# Patient Record
Sex: MALE | Race: OTHER | Employment: FULL TIME | URBAN - METROPOLITAN AREA
[De-identification: names, ages, dates, MRNs, and addresses within clinical notes are randomized per-mention and may not be internally consistent; named-entity substitution may affect disease eponyms.]

---

## 2022-10-26 ENCOUNTER — OFFICE VISIT (OUTPATIENT)
Dept: GASTROENTEROLOGY | Facility: CLINIC | Age: 37
End: 2022-10-26
Payer: COMMERCIAL

## 2022-10-26 VITALS — HEIGHT: 66 IN | BODY MASS INDEX: 31.18 KG/M2 | WEIGHT: 194 LBS

## 2022-10-26 DIAGNOSIS — K31.1 PYLORIC STENOSIS: Primary | ICD-10-CM

## 2022-10-26 DIAGNOSIS — R11.0 NAUSEA: ICD-10-CM

## 2022-10-26 DIAGNOSIS — K22.70 BARRETT'S ESOPHAGUS WITHOUT DYSPLASIA: ICD-10-CM

## 2022-10-26 DIAGNOSIS — A04.8 HELICOBACTER PYLORI (H. PYLORI) INFECTION: ICD-10-CM

## 2022-10-26 DIAGNOSIS — Z12.11 COLON CANCER SCREENING: ICD-10-CM

## 2022-10-26 DIAGNOSIS — R14.0 BLOATING: ICD-10-CM

## 2022-10-26 DIAGNOSIS — K21.9 GASTROESOPHAGEAL REFLUX DISEASE, UNSPECIFIED WHETHER ESOPHAGITIS PRESENT: ICD-10-CM

## 2022-10-26 PROCEDURE — 99244 OFF/OP CNSLTJ NEW/EST MOD 40: CPT | Performed by: NURSE PRACTITIONER

## 2022-10-26 PROCEDURE — 3008F BODY MASS INDEX DOCD: CPT | Performed by: NURSE PRACTITIONER

## 2022-10-26 RX ORDER — OMEPRAZOLE 40 MG/1
40 CAPSULE, DELAYED RELEASE ORAL 2 TIMES DAILY
Qty: 60 CAPSULE | Refills: 0 | Status: SHIPPED | OUTPATIENT
Start: 2022-10-26

## 2022-10-27 ENCOUNTER — TELEPHONE (OUTPATIENT)
Facility: CLINIC | Age: 37
End: 2022-10-27

## 2022-10-27 DIAGNOSIS — K31.1 PYLORIC STENOSIS: Primary | ICD-10-CM

## 2022-10-27 NOTE — TELEPHONE ENCOUNTER
Scheduled for: EGD w/ possible DIL [id number]  Provider Name:  Dr Perico Ojeda  Date:  11/02/2022  Location:  [facility]  Sedation:  MAC   Time:  12:30pm ( pt is aware arrival time 11:30am)  Prep:  NPO after Midnight   Meds/Allergies Reconciled?:    Diagnosis with codes:  H/o pre-pyloric stenosis K31.1  Was patient informed to call insurance with codes (Y/N):  Yes  Referral sent?:  Referral was sent at the time of electronic surgical scheduling. [facility] [address] or [address] [place] [address] notified?:  I sent an electronic request to Endo Scheduling and received a confirmation today. Medication Orders:  Pt is aware to NOT take iron pills, herbal meds and diet supplements for 7 days before exam. Also to NOT take any form of alcohol, recreational drugs and any forms of ED meds 24 hours before exam.   Misc Orders:  Patient was informed that they will need a COVID 19 test prior to their procedure. Patient verbally understood & will await a phone call from [facility] to schedule. Further instructions given by staff:  I provide prep instructions to patient at the time of the appointment and reviewed date, time and location, he verbalized that he understood and is aware to call if he has any questions.

## 2022-10-30 ENCOUNTER — LAB ENCOUNTER (OUTPATIENT)
Dept: LAB | Facility: HOSPITAL | Age: 37
End: 2022-10-30
Attending: INTERNAL MEDICINE
Payer: COMMERCIAL

## 2022-10-30 DIAGNOSIS — Z01.818 PRE-OP TESTING: ICD-10-CM

## 2022-10-31 LAB — SARS-COV-2 RNA RESP QL NAA+PROBE: NOT DETECTED

## 2022-11-02 ENCOUNTER — HOSPITAL ENCOUNTER (OUTPATIENT)
Age: 37
Setting detail: HOSPITAL OUTPATIENT SURGERY
Discharge: HOME | End: 2022-11-02
Attending: INTERNAL MEDICINE | Admitting: INTERNAL MEDICINE
Payer: COMMERCIAL

## 2022-11-02 ENCOUNTER — ANESTHESIA (OUTPATIENT)
Dept: ENDOSCOPY | Age: 37
End: 2022-11-02
Payer: COMMERCIAL

## 2022-11-02 ENCOUNTER — ANESTHESIA EVENT (OUTPATIENT)
Dept: ENDOSCOPY | Age: 37
End: 2022-11-02
Payer: COMMERCIAL

## 2022-11-02 VITALS
RESPIRATION RATE: 18 BRPM | DIASTOLIC BLOOD PRESSURE: 54 MMHG | WEIGHT: 194 LBS | HEART RATE: 66 BPM | HEIGHT: 67 IN | TEMPERATURE: 98 F | SYSTOLIC BLOOD PRESSURE: 92 MMHG | BODY MASS INDEX: 30.45 KG/M2 | OXYGEN SATURATION: 93 %

## 2022-11-02 DIAGNOSIS — Z01.818 PRE-OP TESTING: Primary | ICD-10-CM

## 2022-11-02 DIAGNOSIS — K31.1 PYLORIC STENOSIS: ICD-10-CM

## 2022-11-02 PROCEDURE — 88312 SPECIAL STAINS GROUP 1: CPT | Performed by: INTERNAL MEDICINE

## 2022-11-02 PROCEDURE — 43249 ESOPH EGD DILATION <30 MM: CPT | Performed by: INTERNAL MEDICINE

## 2022-11-02 PROCEDURE — 99070 SPECIAL SUPPLIES PHYS/QHP: CPT | Performed by: INTERNAL MEDICINE

## 2022-11-02 PROCEDURE — 88305 TISSUE EXAM BY PATHOLOGIST: CPT | Performed by: INTERNAL MEDICINE

## 2022-11-02 RX ORDER — NALOXONE HYDROCHLORIDE 0.4 MG/ML
80 INJECTION, SOLUTION INTRAMUSCULAR; INTRAVENOUS; SUBCUTANEOUS AS NEEDED
Status: DISCONTINUED | OUTPATIENT
Start: 2022-11-02 | End: 2022-11-02

## 2022-11-02 RX ORDER — SODIUM CHLORIDE, SODIUM LACTATE, POTASSIUM CHLORIDE, CALCIUM CHLORIDE 600; 310; 30; 20 MG/100ML; MG/100ML; MG/100ML; MG/100ML
INJECTION, SOLUTION INTRAVENOUS CONTINUOUS
Status: DISCONTINUED | OUTPATIENT
Start: 2022-11-02 | End: 2022-11-02

## 2022-11-02 RX ORDER — LIDOCAINE HYDROCHLORIDE 10 MG/ML
INJECTION, SOLUTION EPIDURAL; INFILTRATION; INTRACAUDAL; PERINEURAL AS NEEDED
Status: DISCONTINUED | OUTPATIENT
Start: 2022-11-02 | End: 2022-11-02 | Stop reason: SURG

## 2022-11-02 RX ADMIN — SODIUM CHLORIDE, SODIUM LACTATE, POTASSIUM CHLORIDE, CALCIUM CHLORIDE: 600; 310; 30; 20 INJECTION, SOLUTION INTRAVENOUS at 12:40:00

## 2022-11-02 RX ADMIN — LIDOCAINE HYDROCHLORIDE 50 MG: 10 INJECTION, SOLUTION EPIDURAL; INFILTRATION; INTRACAUDAL; PERINEURAL at 12:42:00

## 2022-11-02 RX ADMIN — SODIUM CHLORIDE, SODIUM LACTATE, POTASSIUM CHLORIDE, CALCIUM CHLORIDE: 600; 310; 30; 20 INJECTION, SOLUTION INTRAVENOUS at 13:01:00

## 2022-11-02 NOTE — OPERATIVE REPORT
[facility] Endoscopy Report      Preoperative Diagnosis:  - bloating postprandial  - history of prepyloric stricture      Postoperative Diagnosis:  - irregular z- line noted   - pre-pyloric stricture s/p TTS balloon dilation to 15 mm    Procedure:    Esophagogastroduodenoscopy       Surgeon:  Elizabeth Benjamin M.D. Anesthesia:  MAC sedation     Technique:  After informed consent, the patient was placed in the left lateral recumbent position. An Olympus adult HD gastroscope was inserted into the hypopharynx and advanced under direct vision into the esophagus, stomach and duodenum. The endoscope was withdrawn to the stomach where retroflexion of the annulus, body, cardia and fundus was performed. In the region before the pylorus a narrowed stricture was noted, was able to pass the scope through this area easily. This was a previously identified stricture in the prepylorus region. A TTS balloon dilation catheter was advanced through the scope gastroscope and positioned over the stricture and inflated to 15 mm and held for 30 seconds then deflated. Subtle mucosal laceration was noted, slight bleeding immediately after but this stopped spontaneously fairly quickly. Biopsies were taken from this region as well. The procedure was discontinued as the patient likely developed some laryngeal spasm with coughing and a brief desaturation which responded to bag mask, please see anesthesia records for full details. Patient did well at the end of the case. I was unable to get biopsies of the distal esophagus as previously planned. Findings: The esophagus showed an irregular Z-line at the GE junction. The GE junction and diaphragmatic impression were at 39 cm. The stomach distended appropriately with insufflated air.   The mucosa of the stomach including cardia, fundus, gastric body and antrum were normal.  Subtle stricture noted, benign-appearing in the prepylorus region which was dilated as outlined above with a TTS balloon dilation catheter to 50 mm. Biopsies were taken from the strictured area. The duodenal bulb and post bulbar regions were normal.    Estimated blood loss-insignificant  Specimens-gastric biopsies of the prepyloric stricture. Impression:  - irregular z- line noted   - pre-pyloric stricture s/p TTS balloon dilation to 15 mm    Recommendations:  - Post procedure /post dilation instructions given  - Restart on omeprazole   - Follow up on biopsies  - consideration for repeat EGD in 1 year to follow up and re-biopsy esophagus           Carmie Handler.  Charles Marie MD  11/2/2022  12:59 PM

## 2022-11-02 NOTE — DISCHARGE INSTRUCTIONS
Home Care Instructions for Gastroscopy with Sedation    Diet:  - Resume your regular diet as tolerated unless otherwise instructed. - Start with light meals to minimize bloating.  - Do not drink alcohol today. Medication:  - If you have questions about resuming your normal medications, please contact your Primary Care Physician. Activities:  - Take it easy today. Do not return to work today. - Do not drive today. - Do not operate any machinery today (including kitchen equipment). Gastroscopy:  - You may have a sore throat for 2-3 days following the exam. This is normal. Gargling with warm salt water (1/2 tsp salt to 1 glass warm water) or using throat lozenges will help. - If you experience any sharp pain in your neck, abdomen or chest, vomiting of blood, oral temperature over 100 degrees Fahrenheit, light-headedness or dizziness, or any other problems, contact your doctor. **If unable to reach your doctor, please go to the [facility] Emergency Room**    - Your referring physician will receive a full report of your examination.  - If you do not hear from your doctor's office within two weeks of your biopsy, please call them for your results. You may be able to see your laboratory results in [facility] between 4 and 7 business days. In some cases, your physician may not have viewed the results before they are released to [address] If you have questions regarding your results contact the physician who ordered the test/exam by phone or via [address] by choosing \"Ask a Medical Question. \"

## 2023-04-18 ENCOUNTER — HOSPITAL ENCOUNTER (EMERGENCY)
Facility: HOSPITAL | Age: 38
Discharge: HOME | End: 2023-04-18
Attending: STUDENT IN AN ORGANIZED HEALTH CARE EDUCATION/TRAINING PROGRAM
Payer: COMMERCIAL

## 2023-04-18 ENCOUNTER — APPOINTMENT (OUTPATIENT)
Dept: CT IMAGING | Facility: HOSPITAL | Age: 38
End: 2023-04-18
Attending: STUDENT IN AN ORGANIZED HEALTH CARE EDUCATION/TRAINING PROGRAM
Payer: COMMERCIAL

## 2023-04-18 ENCOUNTER — TELEPHONE (OUTPATIENT)
Facility: CLINIC | Age: 38
End: 2023-04-18

## 2023-04-18 VITALS
SYSTOLIC BLOOD PRESSURE: 123 MMHG | HEART RATE: 102 BPM | TEMPERATURE: 98 F | RESPIRATION RATE: 18 BRPM | WEIGHT: 180 LBS | BODY MASS INDEX: 28 KG/M2 | OXYGEN SATURATION: 97 % | DIASTOLIC BLOOD PRESSURE: 87 MMHG

## 2023-04-18 DIAGNOSIS — K20.90 ESOPHAGITIS: Primary | ICD-10-CM

## 2023-04-18 LAB
ALBUMIN SERPL-MCNC: 4.7 G/DL (ref 3.4–5)
ALP LIVER SERPL-CCNC: 114 U/L
ALT SERPL-CCNC: 53 U/L
ANION GAP SERPL CALC-SCNC: 9 MMOL/L (ref 0–18)
AST SERPL-CCNC: 24 U/L (ref 15–37)
BILIRUB DIRECT SERPL-MCNC: 0.2 MG/DL (ref 0–0.2)
BILIRUB SERPL-MCNC: 0.9 MG/DL (ref 0.1–2)
BUN BLD-MCNC: 22 MG/DL (ref 7–18)
BUN/CREAT SERPL: 18.2 (ref 10–20)
CALCIUM BLD-MCNC: 9.8 MG/DL (ref 8.5–10.1)
CHLORIDE SERPL-SCNC: 100 MMOL/L (ref 98–112)
CO2 SERPL-SCNC: 31 MMOL/L (ref 21–32)
CREAT BLD-MCNC: 1.21 MG/DL
GFR SERPLBLD BASED ON 1.73 SQ M-ARVRAT: 78 ML/MIN/1.73M2 (ref 60–?)
GLUCOSE BLD-MCNC: 152 MG/DL (ref 70–99)
LIPASE SERPL-CCNC: 19 U/L (ref 13–75)
OSMOLALITY SERPL CALC.SUM OF ELEC: 296 MOSM/KG (ref 275–295)
POTASSIUM SERPL-SCNC: 3.2 MMOL/L (ref 3.5–5.1)
PROT SERPL-MCNC: 9 G/DL (ref 6.4–8.2)
SODIUM SERPL-SCNC: 140 MMOL/L (ref 136–145)

## 2023-04-18 PROCEDURE — 80048 BASIC METABOLIC PNL TOTAL CA: CPT | Performed by: STUDENT IN AN ORGANIZED HEALTH CARE EDUCATION/TRAINING PROGRAM

## 2023-04-18 PROCEDURE — 96375 TX/PRO/DX INJ NEW DRUG ADDON: CPT

## 2023-04-18 PROCEDURE — 99284 EMERGENCY DEPT VISIT MOD MDM: CPT

## 2023-04-18 PROCEDURE — 99285 EMERGENCY DEPT VISIT HI MDM: CPT

## 2023-04-18 PROCEDURE — 74177 CT ABD & PELVIS W/CONTRAST: CPT | Performed by: STUDENT IN AN ORGANIZED HEALTH CARE EDUCATION/TRAINING PROGRAM

## 2023-04-18 PROCEDURE — 85025 COMPLETE CBC W/AUTO DIFF WBC: CPT | Performed by: STUDENT IN AN ORGANIZED HEALTH CARE EDUCATION/TRAINING PROGRAM

## 2023-04-18 PROCEDURE — 83690 ASSAY OF LIPASE: CPT | Performed by: STUDENT IN AN ORGANIZED HEALTH CARE EDUCATION/TRAINING PROGRAM

## 2023-04-18 PROCEDURE — 80076 HEPATIC FUNCTION PANEL: CPT | Performed by: STUDENT IN AN ORGANIZED HEALTH CARE EDUCATION/TRAINING PROGRAM

## 2023-04-18 PROCEDURE — 96374 THER/PROPH/DIAG INJ IV PUSH: CPT

## 2023-04-18 PROCEDURE — 85060 BLOOD SMEAR INTERPRETATION: CPT | Performed by: STUDENT IN AN ORGANIZED HEALTH CARE EDUCATION/TRAINING PROGRAM

## 2023-04-18 PROCEDURE — S0028 INJECTION, FAMOTIDINE, 20 MG: HCPCS | Performed by: STUDENT IN AN ORGANIZED HEALTH CARE EDUCATION/TRAINING PROGRAM

## 2023-04-18 RX ORDER — METOCLOPRAMIDE 10 MG/1
10 TABLET ORAL 3 TIMES DAILY PRN
Qty: 20 TABLET | Refills: 0 | Status: SHIPPED | OUTPATIENT
Start: 2023-04-18 | End: 2023-05-18

## 2023-04-18 RX ORDER — METOCLOPRAMIDE HYDROCHLORIDE 5 MG/ML
10 INJECTION INTRAMUSCULAR; INTRAVENOUS ONCE
Status: COMPLETED | OUTPATIENT
Start: 2023-04-18 | End: 2023-04-18

## 2023-04-18 RX ORDER — POTASSIUM CHLORIDE 20 MEQ/1
40 TABLET, EXTENDED RELEASE ORAL ONCE
Status: COMPLETED | OUTPATIENT
Start: 2023-04-18 | End: 2023-04-18

## 2023-04-18 RX ORDER — ONDANSETRON 2 MG/ML
4 INJECTION INTRAMUSCULAR; INTRAVENOUS ONCE
Status: COMPLETED | OUTPATIENT
Start: 2023-04-18 | End: 2023-04-18

## 2023-04-18 RX ORDER — FAMOTIDINE 10 MG/ML
20 INJECTION, SOLUTION INTRAVENOUS ONCE
Status: COMPLETED | OUTPATIENT
Start: 2023-04-18 | End: 2023-04-18

## 2023-04-18 NOTE — TELEPHONE ENCOUNTER
JAYDONN returned pt call. Wife reports pt has had worsening of upper abd pain and is not tolerating po intake. Wife reports that she took pt to the [address] [place] [address] ED and are in the process of check-in. Think er appropriate for imaging, labs etc.  Advised pt f/u in clinic after discharge.

## 2023-04-18 NOTE — TELEPHONE ENCOUNTER
Derek Gilbert,    Please advise below. Thank you. Called patient to assess symptoms utilizing language line, pt's name/ verified. Reported complaints:acid reflux (chief complaint), nausea, vomiting, burning pain in upper abdomen), bloating (less today than yesterday), has not eaten much today d/t symptons    Onset: yesterday morning    Any difficulty swallowing:No    Burning or sensation of something stuck in throat: No    Hoarseness/sore throat/cough:No    If there is pain/discomfort: Y/N: Yes (burning pain) yesterday          If yes:location:upper abdomen    Pain rating:highest:8-9/10 (yesterday last episode)    Pain rating now: none    Food/drink Triggers: (fatty/greasy/alcohol/coffee):Yes, ate [place] food 2 days ago    Other aggravating factors: denies    Relieving factors: none, took Mylanta last night w/o relief    Taking aspirin: No    Taking PPI? Omeprazole daily (maintenance)    Taking Antacids:prn Mylanta at hs (took last night)    Last BM yesterday am    Denies fever/chills, chest pain. Patient education was given as below:   Avoid Heartburn Triggers  - Laying down after meals (sit upright for at least 2-3 hours after eating meals). - Large meals (serving sizes proportional to palm of hand). - Eating late at night  - Spicy/greasy/acidic foods (fast food, orange/lime/lemon/red sauces).   - Excessive caffeine, alcohol  -Tight fitting clothes     Try:  - Raising the head of the bed approximately 30 degrees  - Avoid food triggers-keep a food diary  - Eat food slowly and chew thoroughly  - Maintain a healthy weight    Language Julio[place]Mauro

## 2023-04-19 LAB
BASOPHILS # BLD AUTO: 0.06 X10(3) UL (ref 0–0.2)
BASOPHILS NFR BLD AUTO: 0.2 %
DEPRECATED RDW RBC AUTO: 37.3 FL (ref 35.1–46.3)
EOSINOPHIL # BLD AUTO: 0 X10(3) UL (ref 0–0.7)
EOSINOPHIL NFR BLD AUTO: 0 %
ERYTHROCYTE [DISTWIDTH] IN BLOOD BY AUTOMATED COUNT: 11.8 % (ref 11–15)
HCT VFR BLD AUTO: 52.9 %
HGB BLD-MCNC: 18.2 G/DL
IMM GRANULOCYTES # BLD AUTO: 0.14 X10(3) UL (ref 0–1)
IMM GRANULOCYTES NFR BLD: 0.5 %
LYMPHOCYTES # BLD AUTO: 1.32 X10(3) UL (ref 1–4)
LYMPHOCYTES NFR BLD AUTO: 4.9 %
MCH RBC QN AUTO: 29.9 PG (ref 26–34)
MCHC RBC AUTO-ENTMCNC: 34.4 G/DL (ref 31–37)
MCV RBC AUTO: 87 FL
MONOCYTES # BLD AUTO: 1.88 X10(3) UL (ref 0.1–1)
MONOCYTES NFR BLD AUTO: 7 %
NEUTROPHILS # BLD AUTO: 23.32 X10 (3) UL (ref 1.5–7.7)
NEUTROPHILS # BLD AUTO: 23.32 X10(3) UL (ref 1.5–7.7)
NEUTROPHILS NFR BLD AUTO: 87.4 %
PLATELET # BLD AUTO: 264 10(3)UL (ref 150–450)
RBC # BLD AUTO: 6.08 X10(6)UL
WBC # BLD AUTO: 26.7 X10(3) UL (ref 4–11)

## 2024-09-25 ENCOUNTER — TELEPHONE (OUTPATIENT)
Facility: CLINIC | Age: 39
End: 2024-09-25

## 2024-09-25 ENCOUNTER — OFFICE VISIT (OUTPATIENT)
Facility: CLINIC | Age: 39
End: 2024-09-25

## 2024-09-25 VITALS
HEIGHT: 67 IN | SYSTOLIC BLOOD PRESSURE: 117 MMHG | BODY MASS INDEX: 29.35 KG/M2 | HEART RATE: 59 BPM | DIASTOLIC BLOOD PRESSURE: 72 MMHG | WEIGHT: 187 LBS

## 2024-09-25 DIAGNOSIS — R10.9 ABDOMINAL PAIN, UNSPECIFIED ABDOMINAL LOCATION: Primary | ICD-10-CM

## 2024-09-25 DIAGNOSIS — R19.4 CHANGE IN BOWEL HABITS: Primary | ICD-10-CM

## 2024-09-25 DIAGNOSIS — K31.1 PYLORIC STENOSIS (HCC): Primary | ICD-10-CM

## 2024-09-25 DIAGNOSIS — K21.9 GASTROESOPHAGEAL REFLUX DISEASE, UNSPECIFIED WHETHER ESOPHAGITIS PRESENT: ICD-10-CM

## 2024-09-25 DIAGNOSIS — R14.0 BLOATING: ICD-10-CM

## 2024-09-25 PROCEDURE — 99214 OFFICE O/P EST MOD 30 MIN: CPT | Performed by: INTERNAL MEDICINE

## 2024-09-25 NOTE — PROGRESS NOTES
Alex Dunham is a 39 year old male.    HPI:     Chief Complaint   Patient presents with    Follow - Up     GERD     The patient is a 39-year-old male who has a history of chronic reflux, prior H. pylori, small hiatal hernia, Akhtar's esophagus, pyloric/prepyloric stenosis who follows up in the office today.  He is here today with his wife.  She assists in translation as he is primary [place]-speaking.    The patient recounts he has been having more bloating, gas and belching.  Some discomfort after meals.  He feels like stenosis is recurring has a similar symptoms when he is had in the past.  He has responded to dilation of the pyloric region.  Has been seen by Dr. Rocha in the past as well as myself.  His last dilation was 2 years ago.  He is not currently on PPI therapy.    The patient does have somewhat irregular bowel habits, occasional thin stools but no blood.  No family history colon cancer.    HISTORY:  Past Medical History:    Akhtar's esophagus    Esophageal reflux    Meds (2014)    H. pylori infection    Management:  Meds (2014)    Hiatal hernia    Small hiatal hernia; Management:  UGI series with Air contrast.      Past Surgical History:   Procedure Laterality Date    Upper gi endoscopy performed  2014      History reviewed. No pertinent family history.   Social History:   Social History     Socioeconomic History    Marital status:    Tobacco Use    Smoking status: Never    Smokeless tobacco: Never   Substance and Sexual Activity    Alcohol use: No     Alcohol/week: 0.0 standard drinks of alcohol    Drug use: No   Other Topics Concern    Caffeine Concern Yes     Comment: Soda, 8 oz 2x a month.        Medications (Active prior to today's visit):  Current Outpatient Medications   Medication Sig Dispense Refill    Omeprazole 40 MG Oral Capsule Delayed Release Take 1 capsule (40 mg total) by mouth in the morning and 1 capsule (40 mg total) before bedtime. Take 1 capsule by mouth daily  before breakfast.. (Patient not taking: Reported on 9/25/2024) 60 capsule 0    ergocalciferol 10861 UNITS Oral Cap Take by mouth.      Omeprazole 40 MG Oral Capsule Delayed Release Take by mouth. (Patient not taking: Reported on 9/25/2024)         Allergies:  No Known Allergies      ROS:   The patient denies any chest pain or shortness of breath,  No neurologic or dermatologic symptoms.     PHYSICAL EXAM:   Blood pressure 117/72, pulse 59, height 5' 7\" (1.702 m), weight 187 lb (84.8 kg).    The patient appears their stated age and is in no acute distress  HEENT- anicteric sclera, neck no lymphadnopathy, OP- clear with no masses or lesions  Chest- Clear bilaterally, no wheezing,  Heart- regular rate, no murmur or gallop  Abdomen- Soft and nontender, nondistended  Ext- no clubbing or cyanosis  Skin- no rashes or lesions  Neuro- appropriate response, alert, no confusion  .  ASSESSMENT/PLAN:   Assessment     The patient is a 39-year-old male who presents with abdominal discomfort and bloating after meals, does have a history of prepyloric stenosis.  Prior response to dilation.  He and his wife report that he did get a really good response after dilation with Dr. Rocha several years ago.  Plan to set this up with Dr. Rocha, discussed potential risks and benefits.  Would like the patient to start back on omeprazole daily until upper endoscopies been completed.    There is a question of Akhtar's esophagus present, this can be reassessed during upper endoscopy.    Plan  Abdominal pain/bloating  History of prepyloric stenosis  - EGD with dilation with Dr. Rocha  - start on omeprazole    Change in bowel habits  - fiber and fluids  - colonoscopy after above EGD /dilation       Orders This Visit:  No orders of the defined types were placed in this encounter.      Meds This Visit:  Requested Prescriptions      No prescriptions requested or ordered in this encounter       Imaging & Referrals:  None       Johnathan Laboy,  MD [facility]

## 2024-09-25 NOTE — TELEPHONE ENCOUNTER
Scheduled for:  EGD w/DIL [id number]/[id number]  Provider Name:  Dr Rocha  Date:  10/2/2024  Location:  [facility]  Sedation:  MAC  Time:  (pt is aware that [facility] will call the day before to confirm arrival time)  Prep:  NPO after midnight  Meds/Allergies Reconciled?:  Physician Reviewed  Diagnosis with codes:    Abdominal pain R10.9  Bloating R14.0  Hx of prepyloric stenosis   Was patient informed to call insurance with codes (Y/N):  Yes  Referral sent?:  Referral was sent at the time of electronic surgical scheduling.  EM or [facility] notified?:  I sent an electronic request to Endo Scheduling and received a confirmation today.  Medication Orders:  Patient is aware to NOT take iron pills, herbal meds and diet supplements for 7 days before exam. Also to NOT take any form of alcohol, recreational drugs and any forms of ED meds 24-72 hours before exam.   Misc Orders:  N/A   Further instructions given by staff:  I discussed the prep instructions with the patient which he verbally understood. I provided patient with prep instruction's sheet in office.      Patient was informed about the new cancellation policy for his/her procedure. Patient was also given a copy of the cancellation policy at the time of the appointment and verbalized understanding.

## 2024-09-25 NOTE — TELEPHONE ENCOUNTER
PPD PA-    Patient is scheduled for EGD w/DIL procedure within the next two weeks on 10/2/2024. Please check for PA.    Thank you!

## 2024-09-25 NOTE — TELEPHONE ENCOUNTER
Scheduled for:  Colonoscopy 573[address]  Provider Name:   Aj  Date:  11/21/2024  Location:  [facility]  Sedation:  MAC  Time:  (pt is aware that ENDO will call the day before the procedure to confirm arrival time)  Prep:     Meds/Allergies Reconciled?:  Physician Reviewed  Diagnosis with codes:    Change in bowel habits   Was patient informed to call insurance with codes (Y/N):  Yes  Referral sent?:  Referral was sent at the time of electronic surgical scheduling.  [facility] or [facility] notified?:  I sent an electronic request to Endo Scheduling and received a confirmation today.  Medication Orders:  Patient is aware to NOT take iron pills, herbal meds and diet supplements for 7 days before exam. Also to NOT take any form of alcohol, recreational drugs and any forms of ED meds 24-72 hours before exam.   Misc Orders:  N/A   Further instructions given by staff:  I discussed the prep instructions with the patient which he verbally understood. I provided patient with prep instruction's sheet in office.      Patient was informed about the new cancellation policy for his/her procedure. Patient was also given a copy of the cancellation policy at the time of the appointment and verbalized understanding.

## 2024-11-18 ENCOUNTER — TELEPHONE (OUTPATIENT)
Facility: CLINIC | Age: 39
End: 2024-11-18

## 2024-11-18 NOTE — TELEPHONE ENCOUNTER
Left message for patient to call back to reschedule Colonoscopy.     Please transfer call to GI surgery scheduling      Also left message that I will be canceling colonoscopy scheduled for 11/21 per patient

## (undated) DEVICE — KIT CLEAN ENDOKIT 1.1OZ GOWNX2

## (undated) DEVICE — Device

## (undated) DEVICE — 35 ML SYRINGE REGULAR TIP: Brand: MONOJECT

## (undated) DEVICE — LINE MNTR ADLT SET O2 INTMD

## (undated) DEVICE — MEDI-VAC NON-CONDUCTIVE SUCTION TUBING 6MM X 1.8M (6FT.) L: Brand: CARDINAL HEALTH

## (undated) DEVICE — CATH BALLOON CRE 15-18MM 5837

## (undated) DEVICE — FORCEP RADIAL JAW 4

## (undated) DEVICE — KIT ENDO ORCAPOD 160/180/190

## (undated) DEVICE — CONMED SCOPE SAVER BITE BLOCK, 20X27 MM: Brand: SCOPE SAVER